# Patient Record
Sex: FEMALE | Race: BLACK OR AFRICAN AMERICAN | NOT HISPANIC OR LATINO | Employment: OTHER | URBAN - METROPOLITAN AREA
[De-identification: names, ages, dates, MRNs, and addresses within clinical notes are randomized per-mention and may not be internally consistent; named-entity substitution may affect disease eponyms.]

---

## 2023-11-21 ENCOUNTER — OFFICE VISIT (OUTPATIENT)
Dept: CARDIOLOGY | Facility: HOSPITAL | Age: 68
End: 2023-11-21
Payer: MEDICARE

## 2023-11-21 VITALS
DIASTOLIC BLOOD PRESSURE: 60 MMHG | SYSTOLIC BLOOD PRESSURE: 140 MMHG | BODY MASS INDEX: 24.24 KG/M2 | WEIGHT: 128.4 LBS | HEIGHT: 61 IN | HEART RATE: 63 BPM

## 2023-11-21 DIAGNOSIS — I25.10 CORONARY ARTERY DISEASE INVOLVING NATIVE CORONARY ARTERY OF NATIVE HEART WITHOUT ANGINA PECTORIS: Primary | ICD-10-CM

## 2023-11-21 DIAGNOSIS — E78.2 MIXED HYPERLIPIDEMIA: ICD-10-CM

## 2023-11-21 DIAGNOSIS — I73.9 PVD (PERIPHERAL VASCULAR DISEASE) (CMS-HCC): ICD-10-CM

## 2023-11-21 DIAGNOSIS — I10 ESSENTIAL HYPERTENSION: ICD-10-CM

## 2023-11-21 PROBLEM — E78.5 HYPERLIPIDEMIA: Status: ACTIVE | Noted: 2023-11-21

## 2023-11-21 PROBLEM — I63.9 CVA (CEREBRAL VASCULAR ACCIDENT) (MULTI): Status: ACTIVE | Noted: 2023-11-21

## 2023-11-21 PROCEDURE — 1159F MED LIST DOCD IN RCRD: CPT | Performed by: NURSE PRACTITIONER

## 2023-11-21 PROCEDURE — 99214 OFFICE O/P EST MOD 30 MIN: CPT | Mod: 25 | Performed by: NURSE PRACTITIONER

## 2023-11-21 PROCEDURE — 3077F SYST BP >= 140 MM HG: CPT | Performed by: NURSE PRACTITIONER

## 2023-11-21 PROCEDURE — 3078F DIAST BP <80 MM HG: CPT | Performed by: NURSE PRACTITIONER

## 2023-11-21 PROCEDURE — 1160F RVW MEDS BY RX/DR IN RCRD: CPT | Performed by: NURSE PRACTITIONER

## 2023-11-21 PROCEDURE — 93005 ELECTROCARDIOGRAM TRACING: CPT | Performed by: NURSE PRACTITIONER

## 2023-11-21 PROCEDURE — 99214 OFFICE O/P EST MOD 30 MIN: CPT | Performed by: NURSE PRACTITIONER

## 2023-11-21 PROCEDURE — 1126F AMNT PAIN NOTED NONE PRSNT: CPT | Performed by: NURSE PRACTITIONER

## 2023-11-21 RX ORDER — LISINOPRIL AND HYDROCHLOROTHIAZIDE 12.5; 2 MG/1; MG/1
TABLET ORAL
COMMUNITY

## 2023-11-21 RX ORDER — BRIMONIDINE TARTRATE AND TIMOLOL MALEATE 2; 5 MG/ML; MG/ML
SOLUTION OPHTHALMIC
COMMUNITY
Start: 2023-09-11 | End: 2024-06-06 | Stop reason: SDUPTHER

## 2023-11-21 RX ORDER — DORZOLAMIDE HYDROCHLORIDE AND TIMOLOL MALEATE 20; 5 MG/ML; MG/ML
1 SOLUTION/ DROPS OPHTHALMIC 2 TIMES DAILY
COMMUNITY
Start: 2023-06-06

## 2023-11-21 RX ORDER — ASPIRIN 81 MG/1
81 TABLET ORAL
COMMUNITY

## 2023-11-21 RX ORDER — ATORVASTATIN CALCIUM 80 MG/1
80 TABLET, FILM COATED ORAL DAILY
COMMUNITY
Start: 2022-02-02

## 2023-11-21 RX ORDER — LATANOPROST 50 UG/ML
SOLUTION/ DROPS OPHTHALMIC
COMMUNITY
Start: 2023-10-24 | End: 2024-06-06 | Stop reason: SDUPTHER

## 2023-11-21 RX ORDER — METOPROLOL TARTRATE 25 MG/1
25 TABLET, FILM COATED ORAL 2 TIMES DAILY
COMMUNITY

## 2023-11-21 RX ORDER — PANTOPRAZOLE SODIUM 20 MG/1
20 TABLET, DELAYED RELEASE ORAL DAILY
COMMUNITY

## 2023-11-21 RX ORDER — CLOPIDOGREL BISULFATE 75 MG/1
TABLET ORAL
COMMUNITY

## 2023-11-21 ASSESSMENT — ENCOUNTER SYMPTOMS
LOSS OF SENSATION IN FEET: 0
OCCASIONAL FEELINGS OF UNSTEADINESS: 0
DEPRESSION: 0

## 2023-11-21 NOTE — PROGRESS NOTES
Subjective   Rupali Bishop is a 68 y.o. female.    Chief Complaint:  Hypertension and Hyperlipidemia    Mrs. Bishop returns for a 6 month follow up. She has been feeling well from a cardiac standpoint. She has remained compliant with her medications, denying any intolerances. She seems to be getting around reasonably well, denying any exertional chest pain or shortness of breath. She denies any recent ER visits or hospitalizations. She offers no specific cardiovascular complaints or concerns today. She denies any complaints of chest pain, shortness of breath, lightheadedness, dizziness, palpitations, syncope, orthopnea, paroxysmal nocturnal dyspnea, lower extremity swelling or bleeding concerns.        Review of Systems   All other systems reviewed and are negative.      Objective   Physical Exam  Constitutional:       Appearance: Healthy appearance. In no distress  Pulmonary:      Effort: Pulmonary effort is normal.      Breath sounds: Normal breath sounds.   Cardiovascular:      Normal rate. Regular rhythm. Normal S1. Normal S2.       Murmurs: There is no murmur.   Edema:     Peripheral edema absent.   Abdominal:      Palpations: Abdomen is soft.   Musculoskeletal: Normal range of motion.      Cervical back: Normal range of motion. Skin:     General: Skin is warm and dry.   Neurological:      Mental Status: Alert and oriented to person, place and time.     EKG obtained and reviewed. Normal sinus rhythm. Septal infarct, age undetermined. HR 63    Lab Review:   Lab Results   Component Value Date     02/13/2023    K 4.1 02/13/2023     02/13/2023    CO2 28 02/13/2023    BUN 10 02/13/2023    CREATININE 0.73 02/13/2023    GLUCOSE 81 02/13/2023    CALCIUM 8.8 02/13/2023     Lab Results   Component Value Date    WBC 6.3 02/13/2023    HGB 11.4 (L) 02/13/2023    HCT 35.3 (L) 02/13/2023    MCV 88 02/13/2023     02/13/2023     Lab Results   Component Value Date    CHOL 133 02/13/2023    TRIG 76 02/13/2023     HDL 55.5 02/13/2023       Assessment/Plan   There were no encounter diagnoses.  Mrs. Bishop is a pleasant 68 year old  female with a past medical history significant for medically managed CAD by cardiac cath in 2015, PVD (iliac as well as a unilateral renal artery stenosis), CVA 2/23/2022 from L ICA occlusion, hypertension, hyperlipidemia and former smoker. Carotid ultrasound 5/2023 showed a string sign in her left internal carotid with no significant right carotid disease, stable and unchanged from prior studies. Echocardiogram 8/2022 showed an EF of 60-65% with no significant valvular disease. She presents today for routine follow up stable from a cardiac standpoint. Her VS and EKG remain stable. She remains on appropriate antiplatelet and lipid lowering therapy with her LDL at goal. She seems to be getting around reasonably well, denying any exertional intolerances. I will have her continue all medications unchanged. She will follow up with us in clinic in 6 months. She knows to call for any concerns.

## 2024-01-18 ENCOUNTER — OFFICE VISIT (OUTPATIENT)
Dept: OPHTHALMOLOGY | Facility: CLINIC | Age: 69
End: 2024-01-18
Payer: MEDICARE

## 2024-01-18 DIAGNOSIS — H40.1130 PRIMARY OPEN ANGLE GLAUCOMA OF BOTH EYES, UNSPECIFIED GLAUCOMA STAGE: Primary | ICD-10-CM

## 2024-01-18 DIAGNOSIS — H04.123 DRY EYES, BILATERAL: ICD-10-CM

## 2024-01-18 DIAGNOSIS — H25.13 AGE-RELATED NUCLEAR CATARACT OF BOTH EYES: ICD-10-CM

## 2024-01-18 DIAGNOSIS — H40.1133 PRIMARY OPEN ANGLE GLAUCOMA (POAG) OF BOTH EYES, SEVERE STAGE: ICD-10-CM

## 2024-01-18 PROCEDURE — 92083 EXTENDED VISUAL FIELD XM: CPT | Performed by: OPHTHALMOLOGY

## 2024-01-18 PROCEDURE — 99212 OFFICE O/P EST SF 10 MIN: CPT | Performed by: OPHTHALMOLOGY

## 2024-01-18 RX ORDER — PREDNISOLONE ACETATE 10 MG/ML
SUSPENSION/ DROPS OPHTHALMIC
COMMUNITY
Start: 2023-11-08 | End: 2024-03-07

## 2024-01-18 RX ORDER — ALBUTEROL SULFATE 90 UG/1
AEROSOL, METERED RESPIRATORY (INHALATION)
COMMUNITY
Start: 2023-12-05

## 2024-01-18 ASSESSMENT — TONOMETRY
OD_IOP_MMHG: 13
IOP_METHOD: GOLDMANN APPLANATION
OS_IOP_MMHG: 26

## 2024-01-18 ASSESSMENT — ENCOUNTER SYMPTOMS
CARDIOVASCULAR NEGATIVE: 0
PSYCHIATRIC NEGATIVE: 0
MUSCULOSKELETAL NEGATIVE: 0
NEUROLOGICAL NEGATIVE: 0
ENDOCRINE NEGATIVE: 0
EYES NEGATIVE: 1
CONSTITUTIONAL NEGATIVE: 0
GASTROINTESTINAL NEGATIVE: 0
HEMATOLOGIC/LYMPHATIC NEGATIVE: 0
RESPIRATORY NEGATIVE: 0
ALLERGIC/IMMUNOLOGIC NEGATIVE: 0

## 2024-01-18 ASSESSMENT — CONF VISUAL FIELD
OS_INFERIOR_TEMPORAL_RESTRICTION: 1
OS_INFERIOR_NASAL_RESTRICTION: 1
OS_SUPERIOR_TEMPORAL_RESTRICTION: 1
OS_SUPERIOR_NASAL_RESTRICTION: 1
COMMENTS: SEE HVF

## 2024-01-18 ASSESSMENT — VISUAL ACUITY
METHOD: SNELLEN - LINEAR
OS_SC: NLP
OD_SC+: +1
OD_BAT_HIGH: 20/40-1
OD_SC: 20/25

## 2024-01-18 ASSESSMENT — SLIT LAMP EXAM - LIDS
COMMENTS: GOOD POSITION
COMMENTS: GOOD POSITION

## 2024-01-18 ASSESSMENT — EXTERNAL EXAM - RIGHT EYE: OD_EXAM: NORMAL

## 2024-01-18 ASSESSMENT — PACHYMETRY
OD_CT(UM): 570
OS_CT(UM): 597

## 2024-01-18 ASSESSMENT — CUP TO DISC RATIO: OD_RATIO: .3

## 2024-01-18 ASSESSMENT — EXTERNAL EXAM - LEFT EYE: OS_EXAM: NORMAL

## 2024-02-08 ENCOUNTER — LAB (OUTPATIENT)
Dept: LAB | Facility: LAB | Age: 69
End: 2024-02-08
Payer: MEDICARE

## 2024-02-08 ENCOUNTER — HOSPITAL ENCOUNTER (OUTPATIENT)
Dept: RADIOLOGY | Facility: CLINIC | Age: 69
Discharge: HOME | End: 2024-02-08
Payer: MEDICARE

## 2024-02-08 DIAGNOSIS — Z12.31 ENCOUNTER FOR SCREENING MAMMOGRAM FOR MALIGNANT NEOPLASM OF BREAST: ICD-10-CM

## 2024-02-08 DIAGNOSIS — I10 ESSENTIAL (PRIMARY) HYPERTENSION: Primary | ICD-10-CM

## 2024-02-08 DIAGNOSIS — E78.00 PURE HYPERCHOLESTEROLEMIA, UNSPECIFIED: ICD-10-CM

## 2024-02-08 LAB
ALBUMIN SERPL BCP-MCNC: 4.2 G/DL (ref 3.4–5)
ALP SERPL-CCNC: 56 U/L (ref 33–136)
ALT SERPL W P-5'-P-CCNC: 11 U/L (ref 7–45)
ANION GAP SERPL CALC-SCNC: 11 MMOL/L (ref 10–20)
AST SERPL W P-5'-P-CCNC: 36 U/L (ref 9–39)
BASOPHILS # BLD AUTO: 0.01 X10*3/UL (ref 0–0.1)
BASOPHILS NFR BLD AUTO: 0.2 %
BILIRUB SERPL-MCNC: 0.5 MG/DL (ref 0–1.2)
BUN SERPL-MCNC: 13 MG/DL (ref 6–23)
CALCIUM SERPL-MCNC: 9.5 MG/DL (ref 8.6–10.3)
CHLORIDE SERPL-SCNC: 102 MMOL/L (ref 98–107)
CHOLEST SERPL-MCNC: 141 MG/DL (ref 0–199)
CHOLESTEROL/HDL RATIO: 2.6
CO2 SERPL-SCNC: 30 MMOL/L (ref 21–32)
CREAT SERPL-MCNC: 0.81 MG/DL (ref 0.5–1.05)
CREAT UR-MCNC: 162.8 MG/DL (ref 20–320)
EGFRCR SERPLBLD CKD-EPI 2021: 79 ML/MIN/1.73M*2
EOSINOPHIL # BLD AUTO: 0.07 X10*3/UL (ref 0–0.7)
EOSINOPHIL NFR BLD AUTO: 1.1 %
ERYTHROCYTE [DISTWIDTH] IN BLOOD BY AUTOMATED COUNT: 13.4 % (ref 11.5–14.5)
GLUCOSE SERPL-MCNC: 98 MG/DL (ref 74–99)
HCT VFR BLD AUTO: 37.3 % (ref 36–46)
HDLC SERPL-MCNC: 54.3 MG/DL
HGB BLD-MCNC: 12 G/DL (ref 12–16)
IMM GRANULOCYTES # BLD AUTO: 0.02 X10*3/UL (ref 0–0.7)
IMM GRANULOCYTES NFR BLD AUTO: 0.3 % (ref 0–0.9)
LDLC SERPL CALC-MCNC: 72 MG/DL
LYMPHOCYTES # BLD AUTO: 2.09 X10*3/UL (ref 1.2–4.8)
LYMPHOCYTES NFR BLD AUTO: 34.3 %
MCH RBC QN AUTO: 28 PG (ref 26–34)
MCHC RBC AUTO-ENTMCNC: 32.2 G/DL (ref 32–36)
MCV RBC AUTO: 87 FL (ref 80–100)
MICROALBUMIN UR-MCNC: 7.4 MG/L
MICROALBUMIN/CREAT UR: 4.5 UG/MG CREAT
MONOCYTES # BLD AUTO: 0.45 X10*3/UL (ref 0.1–1)
MONOCYTES NFR BLD AUTO: 7.4 %
NEUTROPHILS # BLD AUTO: 3.46 X10*3/UL (ref 1.2–7.7)
NEUTROPHILS NFR BLD AUTO: 56.7 %
NON HDL CHOLESTEROL: 87 MG/DL (ref 0–149)
NRBC BLD-RTO: 0 /100 WBCS (ref 0–0)
PLATELET # BLD AUTO: 239 X10*3/UL (ref 150–450)
POTASSIUM SERPL-SCNC: 4.2 MMOL/L (ref 3.5–5.3)
PROT SERPL-MCNC: 6.5 G/DL (ref 6.4–8.2)
RBC # BLD AUTO: 4.29 X10*6/UL (ref 4–5.2)
SODIUM SERPL-SCNC: 139 MMOL/L (ref 136–145)
TRIGL SERPL-MCNC: 73 MG/DL (ref 0–149)
VLDL: 15 MG/DL (ref 0–40)
WBC # BLD AUTO: 6.1 X10*3/UL (ref 4.4–11.3)

## 2024-02-08 PROCEDURE — 85025 COMPLETE CBC W/AUTO DIFF WBC: CPT

## 2024-02-08 PROCEDURE — 80053 COMPREHEN METABOLIC PANEL: CPT

## 2024-02-08 PROCEDURE — 82043 UR ALBUMIN QUANTITATIVE: CPT

## 2024-02-08 PROCEDURE — 36415 COLL VENOUS BLD VENIPUNCTURE: CPT

## 2024-02-08 PROCEDURE — 77067 SCR MAMMO BI INCL CAD: CPT

## 2024-02-08 PROCEDURE — 82570 ASSAY OF URINE CREATININE: CPT

## 2024-02-08 PROCEDURE — 80061 LIPID PANEL: CPT

## 2024-02-08 PROCEDURE — 77067 SCR MAMMO BI INCL CAD: CPT | Performed by: RADIOLOGY

## 2024-02-08 PROCEDURE — 77063 BREAST TOMOSYNTHESIS BI: CPT | Performed by: RADIOLOGY

## 2024-02-19 ENCOUNTER — APPOINTMENT (OUTPATIENT)
Dept: OBSTETRICS AND GYNECOLOGY | Facility: CLINIC | Age: 69
End: 2024-02-19
Payer: MEDICARE

## 2024-03-06 DIAGNOSIS — H04.129 DRY EYE: Primary | ICD-10-CM

## 2024-03-07 RX ORDER — PREDNISOLONE ACETATE 10 MG/ML
SUSPENSION/ DROPS OPHTHALMIC
Qty: 5 ML | Refills: 1 | Status: SHIPPED | OUTPATIENT
Start: 2024-03-07 | End: 2024-06-06 | Stop reason: SDUPTHER

## 2024-03-11 ENCOUNTER — APPOINTMENT (OUTPATIENT)
Dept: OBSTETRICS AND GYNECOLOGY | Facility: CLINIC | Age: 69
End: 2024-03-11
Payer: MEDICARE

## 2024-03-18 ENCOUNTER — OFFICE VISIT (OUTPATIENT)
Dept: OBSTETRICS AND GYNECOLOGY | Facility: CLINIC | Age: 69
End: 2024-03-18
Payer: MEDICARE

## 2024-03-18 VITALS
BODY MASS INDEX: 24.96 KG/M2 | WEIGHT: 132.2 LBS | SYSTOLIC BLOOD PRESSURE: 146 MMHG | DIASTOLIC BLOOD PRESSURE: 84 MMHG | HEIGHT: 61 IN

## 2024-03-18 DIAGNOSIS — N95.0 POSTMENOPAUSAL BLEEDING: Primary | ICD-10-CM

## 2024-03-18 DIAGNOSIS — Z01.818 PREOPERATIVE EXAM FOR GYNECOLOGIC SURGERY: ICD-10-CM

## 2024-03-18 PROCEDURE — 1036F TOBACCO NON-USER: CPT | Performed by: OBSTETRICS & GYNECOLOGY

## 2024-03-18 PROCEDURE — 3079F DIAST BP 80-89 MM HG: CPT | Performed by: OBSTETRICS & GYNECOLOGY

## 2024-03-18 PROCEDURE — 3077F SYST BP >= 140 MM HG: CPT | Performed by: OBSTETRICS & GYNECOLOGY

## 2024-03-18 PROCEDURE — 1159F MED LIST DOCD IN RCRD: CPT | Performed by: OBSTETRICS & GYNECOLOGY

## 2024-03-18 PROCEDURE — 1126F AMNT PAIN NOTED NONE PRSNT: CPT | Performed by: OBSTETRICS & GYNECOLOGY

## 2024-03-18 PROCEDURE — 99214 OFFICE O/P EST MOD 30 MIN: CPT | Performed by: OBSTETRICS & GYNECOLOGY

## 2024-03-18 ASSESSMENT — ENCOUNTER SYMPTOMS
ALLERGIC/IMMUNOLOGIC NEGATIVE: 0
CARDIOVASCULAR NEGATIVE: 0
NEUROLOGICAL NEGATIVE: 0
MUSCULOSKELETAL NEGATIVE: 0
RESPIRATORY NEGATIVE: 0
GASTROINTESTINAL NEGATIVE: 0
HEMATOLOGIC/LYMPHATIC NEGATIVE: 0
ENDOCRINE NEGATIVE: 0
PSYCHIATRIC NEGATIVE: 0
CONSTITUTIONAL NEGATIVE: 0
EYES NEGATIVE: 0

## 2024-03-18 ASSESSMENT — PATIENT HEALTH QUESTIONNAIRE - PHQ9
2. FEELING DOWN, DEPRESSED OR HOPELESS: NOT AT ALL
SUM OF ALL RESPONSES TO PHQ9 QUESTIONS 1 AND 2: 0
1. LITTLE INTEREST OR PLEASURE IN DOING THINGS: NOT AT ALL

## 2024-03-18 ASSESSMENT — PAIN SCALES - GENERAL: PAINLEVEL: 0-NO PAIN

## 2024-03-18 NOTE — PROGRESS NOTES
Subjective   Patient ID: Rupali Bishop is a 68 y.o. female who presents for Gynecologic Exam (Pt here for Vaginal Bleeding x 1month. Last pap none on file. Mammogram 2/8/24 -.).  Patient here complaining of an episode of genital bleeding.  Patient notes that she was on a cruise and noticed some blood on her sheets.  Uncertain as to actual origin.  Believes it was probably the vagina but it could have been external.  Denies any nausea vomiting fever chills no GI or  complaints.  No prior history.  Patient is not sexually active.  No vaginal manipulation.    Gynecologic Exam    Review of Systems   Genitourinary:  Positive for vaginal bleeding.   All other systems reviewed and are negative.    Objective   Physical Exam  Vitals reviewed.   Constitutional:       Appearance: She is normal weight.   Cardiovascular:      Rate and Rhythm: Normal rate and regular rhythm.   Pulmonary:      Effort: Pulmonary effort is normal.      Breath sounds: Normal breath sounds.   Abdominal:      General: Abdomen is flat. Bowel sounds are normal. There is no distension.      Palpations: Abdomen is soft. There is no mass.      Tenderness: There is no abdominal tenderness.   Genitourinary:     Comments: External genitalia unremarkable  Vagina clear positive atrophy  Cervix very atrophic posterior, uterus small difficult to palpate  Adnexa unremarkable  Perineum without lesions or swelling  Neurological:      General: No focal deficit present.      Mental Status: She is alert.   Psychiatric:         Mood and Affect: Mood normal.         Behavior: Behavior normal.         Thought Content: Thought content normal.         Judgment: Judgment normal.         Assessment/Plan   Diagnoses and all orders for this visit:  Postmenopausal bleeding  -     US PELVIS TRANSABDOMINAL WITH TRANSVAGINAL; Future  Reviewed findings with patient.  Patient currently is here with a history of postmenopausal bleeding.  Patient is a little uncertain as to where the  bleeding came from but upon discussion since surgery was probably from the vagina.  No external lesions were noted.  Just a one-time on the sheets.  Small amount.  Will order pelvic ultrasound.  Cervix very atrophic and posterior unable to do office endometrial sampling.  Discussed doing diagnostic hysteroscopy D&C with patient in hospital under anesthesia.  Risks and benefits of procedure reviewed with patient.  Wants to wait till results of ultrasound are back.  Will call and reevaluate       Ernesto Trivedi MD 03/18/24 12:22 PM

## 2024-04-15 ENCOUNTER — APPOINTMENT (OUTPATIENT)
Dept: RADIOLOGY | Facility: HOSPITAL | Age: 69
End: 2024-04-15
Payer: COMMERCIAL

## 2024-05-06 ENCOUNTER — HOSPITAL ENCOUNTER (OUTPATIENT)
Dept: RADIOLOGY | Facility: HOSPITAL | Age: 69
Discharge: HOME | End: 2024-05-06
Payer: COMMERCIAL

## 2024-05-06 DIAGNOSIS — N95.0 POSTMENOPAUSAL BLEEDING: ICD-10-CM

## 2024-05-06 PROCEDURE — 76830 TRANSVAGINAL US NON-OB: CPT | Performed by: RADIOLOGY

## 2024-05-06 PROCEDURE — 76856 US EXAM PELVIC COMPLETE: CPT

## 2024-05-06 PROCEDURE — 76857 US EXAM PELVIC LIMITED: CPT | Performed by: RADIOLOGY

## 2024-05-08 ENCOUNTER — TELEPHONE (OUTPATIENT)
Dept: OBSTETRICS AND GYNECOLOGY | Facility: CLINIC | Age: 69
End: 2024-05-08
Payer: COMMERCIAL

## 2024-05-08 NOTE — RESULT ENCOUNTER NOTE
Ultrasound is unremarkable with normal endometrial thickness.  Patient should come into office for reevaluation and discussion of options

## 2024-05-10 NOTE — TELEPHONE ENCOUNTER
Patient called and verified  RN educated Ultrasound is unremarkable with normal endometrial thickness.    Patient should come into office for reevaluation and discussion of options   RN scheduled appointment for 5/24/24  Heidi Harris RN

## 2024-05-24 ENCOUNTER — OFFICE VISIT (OUTPATIENT)
Dept: OBSTETRICS AND GYNECOLOGY | Facility: CLINIC | Age: 69
End: 2024-05-24
Payer: COMMERCIAL

## 2024-05-24 VITALS
SYSTOLIC BLOOD PRESSURE: 165 MMHG | WEIGHT: 132.8 LBS | BODY MASS INDEX: 25.07 KG/M2 | HEIGHT: 61 IN | DIASTOLIC BLOOD PRESSURE: 74 MMHG

## 2024-05-24 DIAGNOSIS — Z71.2 ENCOUNTER TO DISCUSS TEST RESULTS: ICD-10-CM

## 2024-05-24 DIAGNOSIS — N95.0 POST-MENOPAUSAL BLEEDING: Primary | ICD-10-CM

## 2024-05-24 PROCEDURE — 3078F DIAST BP <80 MM HG: CPT | Performed by: OBSTETRICS & GYNECOLOGY

## 2024-05-24 PROCEDURE — 99213 OFFICE O/P EST LOW 20 MIN: CPT | Performed by: OBSTETRICS & GYNECOLOGY

## 2024-05-24 PROCEDURE — 3077F SYST BP >= 140 MM HG: CPT | Performed by: OBSTETRICS & GYNECOLOGY

## 2024-05-24 PROCEDURE — 1036F TOBACCO NON-USER: CPT | Performed by: OBSTETRICS & GYNECOLOGY

## 2024-05-24 PROCEDURE — 1159F MED LIST DOCD IN RCRD: CPT | Performed by: OBSTETRICS & GYNECOLOGY

## 2024-05-24 PROCEDURE — 1126F AMNT PAIN NOTED NONE PRSNT: CPT | Performed by: OBSTETRICS & GYNECOLOGY

## 2024-05-24 PROCEDURE — 1160F RVW MEDS BY RX/DR IN RCRD: CPT | Performed by: OBSTETRICS & GYNECOLOGY

## 2024-05-24 ASSESSMENT — ENCOUNTER SYMPTOMS
HEMATOLOGIC/LYMPHATIC NEGATIVE: 0
CARDIOVASCULAR NEGATIVE: 0
CONSTITUTIONAL NEGATIVE: 0
PSYCHIATRIC NEGATIVE: 0
NEUROLOGICAL NEGATIVE: 0
MUSCULOSKELETAL NEGATIVE: 0
ALLERGIC/IMMUNOLOGIC NEGATIVE: 0
EYES NEGATIVE: 0
GASTROINTESTINAL NEGATIVE: 0
ENDOCRINE NEGATIVE: 0
RESPIRATORY NEGATIVE: 0

## 2024-05-24 ASSESSMENT — PATIENT HEALTH QUESTIONNAIRE - PHQ9
SUM OF ALL RESPONSES TO PHQ9 QUESTIONS 1 & 2: 1
1. LITTLE INTEREST OR PLEASURE IN DOING THINGS: SEVERAL DAYS
10. IF YOU CHECKED OFF ANY PROBLEMS, HOW DIFFICULT HAVE THESE PROBLEMS MADE IT FOR YOU TO DO YOUR WORK, TAKE CARE OF THINGS AT HOME, OR GET ALONG WITH OTHER PEOPLE: NOT DIFFICULT AT ALL
2. FEELING DOWN, DEPRESSED OR HOPELESS: NOT AT ALL

## 2024-05-24 ASSESSMENT — PAIN SCALES - GENERAL: PAINLEVEL: 0-NO PAIN

## 2024-05-24 NOTE — PROGRESS NOTES
Subjective   Patient ID: Rupali Bishop is a 69 y.o. female who presents for Results (Last pap None on File. Mammogram 2/8/24 -.).  Patient here for consultation with daughter.  Patient notes that she was on a cruise and noticed a spot of blood on the sheets.  Uncertain as to where it came from.  This was possibly the vagina but it could have been rectum.  Patient notes that her close were rubbing and she was irritated down there in general.  Ultrasound reviewed with patient.  Currently is unremarkable.  Normal thickness.  Patient has had no recurrent bleeding.    Review of Systems  Negative outside of that episode of bleeding    Objective   Physical Exam  Constitutional:       Appearance: Normal appearance. She is normal weight.   Neurological:      Mental Status: She is alert.   Psychiatric:         Mood and Affect: Mood normal.         Behavior: Behavior normal.         Thought Content: Thought content normal.         Judgment: Judgment normal.     Assessment/Plan   Diagnoses and all orders for this visit:  Post-menopausal bleeding  Encounter to discuss test results    Discussion with patient and daughter.  Chart reviewed.  Patient had an episode of bleeding while on a cruise 1 spot of blood.  Uncertain as to exactly where it came from.  Please may have been vaginal could also been rectal.  Patient notes she was having a lot of irritation in that whole area.  Has had no issues since.  Ultrasound shows normal thickness.  Unable to do endometrial biopsy in office.  Reviewed options with patient.  Scheduled for diagnostic hysteroscopy D&C versus observation.  In view of negative ultrasound and questionable source of bleeding with no further episodes, believe it is reasonable to observe this patient.  She does realize there is a small chance of abnormal findings.  Patient will likely observe understand risks and benefits       Ernesto Trivedi MD 05/24/24 12:21 PM

## 2024-06-06 ENCOUNTER — OFFICE VISIT (OUTPATIENT)
Dept: OPHTHALMOLOGY | Facility: CLINIC | Age: 69
End: 2024-06-06
Payer: COMMERCIAL

## 2024-06-06 DIAGNOSIS — H04.129 DRY EYE: ICD-10-CM

## 2024-06-06 DIAGNOSIS — H40.1130 PRIMARY OPEN ANGLE GLAUCOMA OF BOTH EYES, UNSPECIFIED GLAUCOMA STAGE: Primary | ICD-10-CM

## 2024-06-06 PROCEDURE — 99214 OFFICE O/P EST MOD 30 MIN: CPT | Performed by: OPHTHALMOLOGY

## 2024-06-06 PROCEDURE — 92133 CPTRZD OPH DX IMG PST SGM ON: CPT | Performed by: OPHTHALMOLOGY

## 2024-06-06 RX ORDER — PREDNISOLONE ACETATE 10 MG/ML
SUSPENSION/ DROPS OPHTHALMIC
Qty: 5 ML | Refills: 11 | Status: SHIPPED | OUTPATIENT
Start: 2024-06-06

## 2024-06-06 RX ORDER — BRIMONIDINE TARTRATE AND TIMOLOL MALEATE 2; 5 MG/ML; MG/ML
SOLUTION OPHTHALMIC
Qty: 10 ML | Refills: 11 | Status: SHIPPED | OUTPATIENT
Start: 2024-06-06

## 2024-06-06 RX ORDER — LATANOPROST 50 UG/ML
SOLUTION/ DROPS OPHTHALMIC
Qty: 2.5 ML | Refills: 11 | Status: SHIPPED | OUTPATIENT
Start: 2024-06-06

## 2024-06-06 ASSESSMENT — VISUAL ACUITY
OD_SC: 20/25
OD_SC+: -3
OS_SC: NLP
METHOD: SNELLEN - LINEAR

## 2024-06-06 ASSESSMENT — ENCOUNTER SYMPTOMS
PSYCHIATRIC NEGATIVE: 0
RESPIRATORY NEGATIVE: 0
ALLERGIC/IMMUNOLOGIC NEGATIVE: 0
ENDOCRINE NEGATIVE: 0
CARDIOVASCULAR NEGATIVE: 0
CONSTITUTIONAL NEGATIVE: 0
NEUROLOGICAL NEGATIVE: 0
GASTROINTESTINAL NEGATIVE: 0
HEMATOLOGIC/LYMPHATIC NEGATIVE: 0
MUSCULOSKELETAL NEGATIVE: 0
EYES NEGATIVE: 1

## 2024-06-06 ASSESSMENT — GONIOSCOPY
OS_SUPERIOR: 3/360
OD_SUPERIOR: 3/360

## 2024-06-06 ASSESSMENT — CUP TO DISC RATIO
OS_RATIO: 1.0
OD_RATIO: .3

## 2024-06-06 ASSESSMENT — TONOMETRY
OD_IOP_MMHG: 13
OS_IOP_MMHG: 18
IOP_METHOD: GOLDMANN APPLANATION

## 2024-06-06 ASSESSMENT — CONF VISUAL FIELD
OS_SUPERIOR_NASAL_RESTRICTION: 1
OS_INFERIOR_TEMPORAL_RESTRICTION: 1
OS_INFERIOR_NASAL_RESTRICTION: 1
OS_SUPERIOR_TEMPORAL_RESTRICTION: 1

## 2024-06-06 ASSESSMENT — EXTERNAL EXAM - RIGHT EYE: OD_EXAM: NORMAL

## 2024-06-06 ASSESSMENT — EXTERNAL EXAM - LEFT EYE: OS_EXAM: NORMAL

## 2024-06-06 ASSESSMENT — PACHYMETRY
OD_CT(UM): 570
OS_CT(UM): 597

## 2024-06-06 ASSESSMENT — SLIT LAMP EXAM - LIDS
COMMENTS: GOOD POSITION
COMMENTS: GOOD POSITION

## 2024-06-06 NOTE — PROGRESS NOTES
1-advanced oag os; mild od stable on meds  2-ns ou  refr optom glasses od  3-sanchez start tears ou  4-anterior ischemic optic neuropathy (AION) os  
ambulatory

## 2024-08-01 ENCOUNTER — APPOINTMENT (OUTPATIENT)
Dept: OPHTHALMOLOGY | Facility: CLINIC | Age: 69
End: 2024-08-01
Payer: COMMERCIAL

## 2024-08-19 ENCOUNTER — APPOINTMENT (OUTPATIENT)
Dept: OBSTETRICS AND GYNECOLOGY | Facility: CLINIC | Age: 69
End: 2024-08-19
Payer: COMMERCIAL

## 2024-10-02 ENCOUNTER — LAB (OUTPATIENT)
Dept: LAB | Facility: LAB | Age: 69
End: 2024-10-02
Payer: COMMERCIAL

## 2024-10-02 DIAGNOSIS — I10 ESSENTIAL (PRIMARY) HYPERTENSION: Primary | ICD-10-CM

## 2024-10-02 DIAGNOSIS — E78.01 FAMILIAL HYPERCHOLESTEROLEMIA: ICD-10-CM

## 2024-10-02 LAB
ALBUMIN SERPL BCP-MCNC: 4.2 G/DL (ref 3.4–5)
ALP SERPL-CCNC: 57 U/L (ref 33–136)
ALT SERPL W P-5'-P-CCNC: 15 U/L (ref 7–45)
ANION GAP SERPL CALC-SCNC: 12 MMOL/L (ref 10–20)
AST SERPL W P-5'-P-CCNC: 35 U/L (ref 9–39)
BASOPHILS # BLD AUTO: 0.02 X10*3/UL (ref 0–0.1)
BASOPHILS NFR BLD AUTO: 0.3 %
BILIRUB SERPL-MCNC: 0.3 MG/DL (ref 0–1.2)
BUN SERPL-MCNC: 11 MG/DL (ref 6–23)
CALCIUM SERPL-MCNC: 9.7 MG/DL (ref 8.6–10.6)
CHLORIDE SERPL-SCNC: 104 MMOL/L (ref 98–107)
CO2 SERPL-SCNC: 30 MMOL/L (ref 21–32)
CREAT SERPL-MCNC: 0.8 MG/DL (ref 0.5–1.05)
EGFRCR SERPLBLD CKD-EPI 2021: 79 ML/MIN/1.73M*2
EOSINOPHIL # BLD AUTO: 0.09 X10*3/UL (ref 0–0.7)
EOSINOPHIL NFR BLD AUTO: 1.3 %
ERYTHROCYTE [DISTWIDTH] IN BLOOD BY AUTOMATED COUNT: 15.6 % (ref 11.5–14.5)
GLUCOSE SERPL-MCNC: 102 MG/DL (ref 74–99)
HCT VFR BLD AUTO: 29.1 % (ref 36–46)
HGB BLD-MCNC: 9.3 G/DL (ref 12–16)
IMM GRANULOCYTES # BLD AUTO: 0.01 X10*3/UL (ref 0–0.7)
IMM GRANULOCYTES NFR BLD AUTO: 0.1 % (ref 0–0.9)
LYMPHOCYTES # BLD AUTO: 2.78 X10*3/UL (ref 1.2–4.8)
LYMPHOCYTES NFR BLD AUTO: 38.7 %
MCH RBC QN AUTO: 29.7 PG (ref 26–34)
MCHC RBC AUTO-ENTMCNC: 32 G/DL (ref 32–36)
MCV RBC AUTO: 93 FL (ref 80–100)
MONOCYTES # BLD AUTO: 0.52 X10*3/UL (ref 0.1–1)
MONOCYTES NFR BLD AUTO: 7.2 %
NEUTROPHILS # BLD AUTO: 3.77 X10*3/UL (ref 1.2–7.7)
NEUTROPHILS NFR BLD AUTO: 52.4 %
NRBC BLD-RTO: 0 /100 WBCS (ref 0–0)
PLATELET # BLD AUTO: 291 X10*3/UL (ref 150–450)
POTASSIUM SERPL-SCNC: 3.9 MMOL/L (ref 3.5–5.3)
PROT SERPL-MCNC: 6.7 G/DL (ref 6.4–8.2)
RBC # BLD AUTO: 3.13 X10*6/UL (ref 4–5.2)
SODIUM SERPL-SCNC: 142 MMOL/L (ref 136–145)
WBC # BLD AUTO: 7.2 X10*3/UL (ref 4.4–11.3)

## 2024-10-02 PROCEDURE — 80053 COMPREHEN METABOLIC PANEL: CPT

## 2024-10-02 PROCEDURE — 85025 COMPLETE CBC W/AUTO DIFF WBC: CPT

## 2024-10-03 ENCOUNTER — APPOINTMENT (OUTPATIENT)
Dept: OPHTHALMOLOGY | Facility: CLINIC | Age: 69
End: 2024-10-03
Payer: COMMERCIAL

## 2024-10-23 ENCOUNTER — HOSPITAL ENCOUNTER (OUTPATIENT)
Dept: VASCULAR MEDICINE | Facility: HOSPITAL | Age: 69
Discharge: HOME | End: 2024-10-23
Payer: COMMERCIAL

## 2024-10-23 DIAGNOSIS — I73.9 PVD (PERIPHERAL VASCULAR DISEASE) (CMS-HCC): ICD-10-CM

## 2024-10-23 PROCEDURE — 93922 UPR/L XTREMITY ART 2 LEVELS: CPT

## 2024-10-23 PROCEDURE — 93922 UPR/L XTREMITY ART 2 LEVELS: CPT | Performed by: INTERNAL MEDICINE

## 2024-10-31 ENCOUNTER — APPOINTMENT (OUTPATIENT)
Dept: OPHTHALMOLOGY | Facility: CLINIC | Age: 69
End: 2024-10-31
Payer: COMMERCIAL

## 2024-10-31 DIAGNOSIS — H52.4 REGULAR ASTIGMATISM OF BOTH EYES WITH PRESBYOPIA: ICD-10-CM

## 2024-10-31 DIAGNOSIS — H25.11 AGE-RELATED NUCLEAR CATARACT OF RIGHT EYE: Primary | ICD-10-CM

## 2024-10-31 DIAGNOSIS — H40.1130 PRIMARY OPEN ANGLE GLAUCOMA OF BOTH EYES, UNSPECIFIED GLAUCOMA STAGE: ICD-10-CM

## 2024-10-31 DIAGNOSIS — H04.123 DRY EYE SYNDROME OF BOTH EYES: ICD-10-CM

## 2024-10-31 DIAGNOSIS — H25.22 MORGAGNIAN AGE-RELATED CATARACT OF LEFT EYE: ICD-10-CM

## 2024-10-31 DIAGNOSIS — H52.223 REGULAR ASTIGMATISM OF BOTH EYES WITH PRESBYOPIA: ICD-10-CM

## 2024-10-31 PROCEDURE — 92015 DETERMINE REFRACTIVE STATE: CPT | Performed by: STUDENT IN AN ORGANIZED HEALTH CARE EDUCATION/TRAINING PROGRAM

## 2024-10-31 PROCEDURE — 92012 INTRM OPH EXAM EST PATIENT: CPT | Performed by: STUDENT IN AN ORGANIZED HEALTH CARE EDUCATION/TRAINING PROGRAM

## 2024-10-31 ASSESSMENT — ENCOUNTER SYMPTOMS
PSYCHIATRIC NEGATIVE: 0
NEUROLOGICAL NEGATIVE: 0
GASTROINTESTINAL NEGATIVE: 0
MUSCULOSKELETAL NEGATIVE: 0
ALLERGIC/IMMUNOLOGIC NEGATIVE: 0
EYES NEGATIVE: 0
RESPIRATORY NEGATIVE: 0
HEMATOLOGIC/LYMPHATIC NEGATIVE: 0
CONSTITUTIONAL NEGATIVE: 0
CARDIOVASCULAR NEGATIVE: 0
ENDOCRINE NEGATIVE: 0

## 2024-10-31 ASSESSMENT — CONF VISUAL FIELD
OD_SUPERIOR_NASAL_RESTRICTION: 0
OD_NORMAL: 1
OS_SUPERIOR_NASAL_RESTRICTION: 1
OD_INFERIOR_TEMPORAL_RESTRICTION: 0
OD_INFERIOR_NASAL_RESTRICTION: 0
OD_SUPERIOR_TEMPORAL_RESTRICTION: 0
OS_SUPERIOR_TEMPORAL_RESTRICTION: 1
OS_INFERIOR_NASAL_RESTRICTION: 1
OS_INFERIOR_TEMPORAL_RESTRICTION: 1

## 2024-10-31 ASSESSMENT — VISUAL ACUITY
OD_PH_SC: 20/20
OD_SC: 20/40
OS_SC: NLP
OD_PH_SC+: -2
METHOD: SNELLEN - LINEAR

## 2024-10-31 ASSESSMENT — REFRACTION_WEARINGRX
OD_AXIS: 145
OS_SPHERE: BALANCE
OD_CYLINDER: -0.50
OS_CYLINDER: SPHERE
SPECS_TYPE: READING
OD_SPHERE: +1.75

## 2024-10-31 ASSESSMENT — EXTERNAL EXAM - LEFT EYE: OS_EXAM: NORMAL

## 2024-10-31 ASSESSMENT — EXTERNAL EXAM - RIGHT EYE: OD_EXAM: NORMAL

## 2024-10-31 ASSESSMENT — PACHYMETRY
OD_CT(UM): 570
OS_CT(UM): 597

## 2024-10-31 ASSESSMENT — REFRACTION_MANIFEST
OD_SPHERE: -0.25
OS_CYLINDER: SPHERE
OD_CYLINDER: -0.50
OD_ADD: +2.50
OD_AXIS: 140

## 2024-11-18 ENCOUNTER — OFFICE VISIT (OUTPATIENT)
Dept: URGENT CARE | Age: 69
End: 2024-11-18
Payer: COMMERCIAL

## 2024-11-18 VITALS
SYSTOLIC BLOOD PRESSURE: 165 MMHG | HEART RATE: 75 BPM | TEMPERATURE: 97.7 F | WEIGHT: 136 LBS | OXYGEN SATURATION: 96 % | BODY MASS INDEX: 25.7 KG/M2 | DIASTOLIC BLOOD PRESSURE: 69 MMHG

## 2024-11-18 DIAGNOSIS — H61.21 HEARING LOSS DUE TO CERUMEN IMPACTION, RIGHT: Primary | ICD-10-CM

## 2024-11-18 DIAGNOSIS — I10 HYPERTENSION, UNSPECIFIED TYPE: ICD-10-CM

## 2024-11-18 PROCEDURE — 99203 OFFICE O/P NEW LOW 30 MIN: CPT | Performed by: PHYSICIAN ASSISTANT

## 2024-11-18 PROCEDURE — 1160F RVW MEDS BY RX/DR IN RCRD: CPT | Performed by: PHYSICIAN ASSISTANT

## 2024-11-18 PROCEDURE — 3077F SYST BP >= 140 MM HG: CPT | Performed by: PHYSICIAN ASSISTANT

## 2024-11-18 PROCEDURE — 69209 REMOVE IMPACTED EAR WAX UNI: CPT | Performed by: PHYSICIAN ASSISTANT

## 2024-11-18 PROCEDURE — 1036F TOBACCO NON-USER: CPT | Performed by: PHYSICIAN ASSISTANT

## 2024-11-18 PROCEDURE — 3078F DIAST BP <80 MM HG: CPT | Performed by: PHYSICIAN ASSISTANT

## 2024-11-18 PROCEDURE — 1159F MED LIST DOCD IN RCRD: CPT | Performed by: PHYSICIAN ASSISTANT

## 2024-11-18 NOTE — PATIENT INSTRUCTIONS
"What is ear wax impaction?   - Ear wax impaction is when ear wax builds up enough to cause symptoms. Normally, ear wax helps to protect the insides of the ears and prevents injury or infection. But having too much ear wax can cause symptoms such as pain and trouble hearing. The medical term for ear wax is \"cerumen.\"  Young children and older adults are more likely than others to have ear wax impaction.    What causes ear wax impaction? - Several different things can cause ear wax impaction:  -Diseases that affect the ear - Some health problems can affect the shape of the inside of the ear, and make it hard for wax to move out. For example, skin problems that cause skin cells to shed a lot can lead to wax build-up in the ears.  -A narrow ear canal - In some people, the ear canals are narrower than in others. These people might be more likely to have ear wax impaction. A person's ear canal can become narrower after an ear injury or after severe or multiple ear infections.  -Changes in ear wax and lining due to aging - As people get older, their ear wax gets harder and thicker. This makes it difficult for the wax to move out of the ear as it should.  -Bad ear-cleaning habits - Some people try to clean their ears using cotton swabs (Q-Tips) or other tools. This can actually push the wax deeper into the ear instead of getting it out. Over time, this can cause ear wax impaction.  -Making too much ear wax - Some people make more ear wax than others. This can happen when water gets trapped in the ear, or when the ear is injured. But some people have a lot of ear wax for no obvious reason.    What are the symptoms of ear wax impaction? - The symptoms include:  -Trouble hearing  -Pain in the ear  -Hearing a ringing noise in the ear  -Feeling like the ear is blocked or plugged    These symptoms can happen in one or both ears.  Should I see a doctor or nurse? - Yes. If you or your child has any of these symptoms, see a doctor " or nurse. He or she can check the insides of the ears to figure out if the symptoms are caused by ear wax impaction or another problem, such as an ear infection.  Should I clean my (or my child's) ears at home? - No. The insides of the ears do not usually need to be cleaned. Sticking things into the ears can push the wax in deeper and cause impaction.    How is ear wax impaction treated? - There are several treatments to remove impacted ear wax. Doctors and nurses offer these treatments only to people who have bothersome symptoms. They do not recommend treatments for removing ear wax in people who have no symptoms, even if their ears are impacted.  In some cases, doctors and nurses will remove ear wax in people whose ears are impacted and who aren't able to let others know if they have symptoms or not. This can include young children, and people who are confused or have trouble speaking, including some older adults.    There are several different ways to remove ear wax:  -Ear drops - Special ear drops can soften ear wax and help it to drain out. Ear drops are not usually safe for people with an ear infection or damage to the eardrum.  -Rinsing - In some cases, a doctor or nurse can remove impacted ear wax by squirting water (or a special liquid) into the ear to rinse it out.  Special tools - A doctor or nurse might use a special tool to remove ear wax. There are different types of tools that can do this safely. These include small sticks, hooks, and spoons. There are also tools that use suction to pull the wax out.

## 2024-11-18 NOTE — PROGRESS NOTES
Subjective   Patient ID: Rupali Bishop is a 69 y.o. female. They present today with a chief complaint of Cerumen Impaction (Right ear ).    History of Present Illness  HPI  Patient presents to the urgent care as a 69-year-old female with concerns for possible cerumen impaction of her right ear.  Patient states her primary care recommended her to use eardrops, but her symptoms did not improve patient states she got a bulb syringe and tried to flush her ear, but no cerumen came out.    Past Medical History  Allergies as of 11/18/2024 - Reviewed 11/18/2024   Allergen Reaction Noted    Penicillins Hives and Unknown 05/10/2006       (Not in a hospital admission)       Past Medical History:   Diagnosis Date    Cataract     Essential (primary) hypertension 11/16/2022    Hypertension    Glaucoma     Hyperlipidemia, unspecified 11/16/2022    Hyperlipidemia    Occlusion and stenosis of unspecified carotid artery 10/13/2015    Carotid stenosis    Other cerebral infarction due to occlusion or stenosis of small artery 04/22/2022    Lacunar infarction    Personal history of diseases of the blood and blood-forming organs and certain disorders involving the immune mechanism     History of bleeding disorder       Past Surgical History:   Procedure Laterality Date    CT ANGIO NECK  2/24/2022    CT NECK ANGIO W AND WO IV CONTRAST 2/24/2022 [facility]    MR HEAD ANGIO WO IV CONTRAST  2/26/2013    MR HEAD ANGIO WO IV CONTRAST 2/26/2013 [facility]INICAL [facility]GAC[facility]    MR HEAD ANGIO WO IV CONTRAST  2/23/2022    MR HEAD ANGIO WO IV CONTRAST 2/23/2022 [facility]    MR NECK ANGIO WO IV CONTRAST  2/26/2013    MR NECK ANGIO WO IV CONTRAST 2/26/2013 [facility]    MR NECK ANGIO WO IV CONTRAST  2/23/2022    MR NECK ANGIO WO IV CONTRAST 2/23/2022 [facility]        reports that she quit smoking about 21 months ago. Her smoking use included cigarettes. She has never used smokeless tobacco. She reports current alcohol  use. She reports that she does not use drugs.    Review of Systems  Review of Systems     Patient denies sore throat, fever, nausea, diarrhea, vomiting, rash, dizziness, shortness of breath, increased urinary frequency, chills, peripheral edema, abdominal pain, chest pain.                          Objective    Vitals:    11/18/24 1605 11/18/24 1606   BP: (!) 189/79 165/69   BP Location: Right arm Left arm   Pulse: 75    Temp: 36.5 °C (97.7 °F)    SpO2: 96%    Weight: 61.7 kg (136 lb)      No LMP recorded. Patient is postmenopausal.    Physical Exam  Appearance: Alert, oriented, cooperative, in no acute distress. Well nourished & well hydrated.    Skin: Intact, no lesions, rash, petechiae or purpura.     Eyes: Conjunctiva pink with no redness or exudates. Eyelids without lesions. No scleral icterus.     ENT: Hearing grossly intact. External inspection of ears without lesions or erythema. no nasal flaring. no tripoding, no drooling, no difficulty swallowing oral secretions. Rt TM obscured by cerumen, EAC nonerythematous, Lt TM good light reflex, non-bulging, EAC no erythema or edema.  Cerumen was removed with irrigation, Rt TM well visualized after, non-bulging, good light reflex    Pulmonary:  Good chest wall excursion. No accessory muscle use or stridor. No difficulty completing a full sentence without taking a breath, no labored breathing w ambulation     Cardiac:  No JVD.     Musculoskeletal: no deformity. No cyanosis, clubbing.     Neurological: no focal findings identified.    Psychiatric: Appropriate mood and affect.     Procedures    Point of Care Test & Imaging Results from this visit  No results found for this visit on 11/18/24.   No results found.    Diagnostic study results (if any) were reviewed by Carmen King PA-C.    Assessment/Plan   Allergies, medications, history, and pertinent labs/EKGs/Imaging reviewed by Carmen King PA-C.     Medical Decision Making  Considerations for patient's  symptoms include cerumen impaction, otitis externa, otitis media, middle ear effusion. No signs of systemic illness or osteomyelitis at this time, VSS, patient is afebrile, denies chills, fevers, nausea, and no signs of mastoiditis, no mastoid tenderness on exam. Physical exam: rt TM obscured by cerumen, Cerumen was removed with irrigation, rt TM well visualized after, non-bulging, good light reflex, pt reports symptom resolution. If symptoms are not improving or if they are worsening the patient will call their primary care physician and go to the ER. Patient verbalizes understanding and agrees with plan of care.  All questions were answered.  Dictation software was used in the creation of this note which does not evaluate or correct for typographical, spelling, syntax or grammatical errors.     Orders and Diagnoses  There are no diagnoses linked to this encounter.    Medical Admin Record      Patient disposition: Home    Electronically signed by Carmen King PA-C  4:15 PM

## 2025-01-07 ENCOUNTER — OFFICE VISIT (OUTPATIENT)
Dept: GASTROENTEROLOGY | Facility: HOSPITAL | Age: 70
End: 2025-01-07
Payer: COMMERCIAL

## 2025-01-07 VITALS — WEIGHT: 136 LBS | BODY MASS INDEX: 25.68 KG/M2 | HEIGHT: 61 IN

## 2025-01-07 DIAGNOSIS — D12.6 ADENOMATOUS POLYP OF COLON, UNSPECIFIED PART OF COLON: Primary | ICD-10-CM

## 2025-01-07 PROCEDURE — 1159F MED LIST DOCD IN RCRD: CPT

## 2025-01-07 PROCEDURE — 99204 OFFICE O/P NEW MOD 45 MIN: CPT

## 2025-01-07 PROCEDURE — 99214 OFFICE O/P EST MOD 30 MIN: CPT

## 2025-01-07 PROCEDURE — 3008F BODY MASS INDEX DOCD: CPT

## 2025-01-07 RX ORDER — POLYETHYLENE GLYCOL 3350, SODIUM SULFATE ANHYDROUS, SODIUM BICARBONATE, SODIUM CHLORIDE, POTASSIUM CHLORIDE 236; 22.74; 6.74; 5.86; 2.97 G/4L; G/4L; G/4L; G/4L; G/4L
4000 POWDER, FOR SOLUTION ORAL ONCE
Qty: 4000 ML | Refills: 0 | Status: SHIPPED | OUTPATIENT
Start: 2025-01-07 | End: 2025-01-07

## 2025-01-07 ASSESSMENT — ENCOUNTER SYMPTOMS
BLOOD IN STOOL: 0
COUGH: 0
CONSTIPATION: 0
FEVER: 0
ABDOMINAL PAIN: 0
CHILLS: 0
DIARRHEA: 0
APPETITE CHANGE: 0
SHORTNESS OF BREATH: 0
RECTAL PAIN: 0
FATIGUE: 0
ANAL BLEEDING: 0
VOMITING: 0
TROUBLE SWALLOWING: 0
ABDOMINAL DISTENTION: 0
NAUSEA: 0

## 2025-01-07 NOTE — PATIENT INSTRUCTIONS
Please schedule your colonoscopy. You will need a ride since this involves sedation.  I will send a bowel prep to your pharmacy.  Clear liquid diet the day before the procedure. Start the 1st part of the prep at 6 pm the night prior and the other half 5 hrs before the procedure.  Please hold Plavix 5 days prior to your procedure.  Confirm with prescribing physician.

## 2025-01-07 NOTE — PROGRESS NOTES
Subjective     History of Present Illness:   Rupali Bishop is a 69 y.o. female with PMHx of CVA on plavix, CAD, tobacco use disorder, HTN, HLD who presents to GI clinic for further evaluation of colonoscopy consultation    Today,  [place] states she moves her bowels daily to every other day with normal formed stools. States she does not notice any blood in her stools or any GI abnormalities.  Denies constipation, diarrhea, dyspepsia, melena, hematochezia, dysphagia, unintentional weight loss  10/2024 hemoglobin 9.3- normocytic anemia, 2024 hemoglobin 12.0    Rare ETOH, denies smoking or marijuana  Denies fxh GI cancer or IBD  Abdominal Surgeries: denies    Last colonoscopy 3/2021 Dr. White for screenin mm TA rectum, sigmoid diverticulosis, external hemorrhoids.  Repeat 5 years  Last EGD   2022 echo- normal EF      Past Medical History  As per HPI.     Social History  she  reports that she quit smoking about 2 years ago. Her smoking use included cigarettes. She has never used smokeless tobacco. She reports current alcohol use. She reports that she does not use drugs.     Family History  her family history includes Breast cancer in her sister; Glaucoma in her mother and sister.     Review of Systems  Review of Systems   Constitutional:  Negative for appetite change, chills, fatigue and fever.   HENT:  Negative for trouble swallowing.    Respiratory:  Negative for cough and shortness of breath.    Gastrointestinal:  Negative for abdominal distention, abdominal pain, anal bleeding, blood in stool, constipation, diarrhea, nausea, rectal pain and vomiting.       Allergies  Allergies   Allergen Reactions    Penicillins Hives and Unknown       Medications  Current Outpatient Medications   Medication Instructions    albuterol 90 mcg/actuation inhaler 2 PUFFS INHALED ORALLY EVERY 6 HOURS AS NEEDED    aspirin 81 mg, oral, Daily RT    atorvastatin (LIPITOR) 80 mg, oral, Daily    brimonidine-timoloL (Combigan) 0.2-0.5  % ophthalmic solution INSTILL 1 DROP TWICE A DAY INTO LEFT EYE    clopidogrel (Plavix) 75 mg tablet TAKE 1 TABLET (75 MG) BY MOUTH DAILY    dorzolamide-timoloL (Cosopt) 22.3-6.8 mg/mL ophthalmic solution 1 drop, Left Eye, 2 times daily    latanoprost (Xalatan) 0.005 % ophthalmic solution 1 DROP EACH EYE ONCE A DAY    lisinopriL-hydrochlorothiazide 20-12.5 mg tablet 1 TABLET ORALLY DAILY    metoprolol tartrate (LOPRESSOR) 25 mg, oral, 2 times daily    pantoprazole (PROTONIX) 20 mg, oral, Daily    prednisoLONE acetate (Pred-Forte) 1 % ophthalmic suspension 1 DROP INTO LEFT EYE EVERY DAY        Objective   There were no vitals taken for this visit.   Physical Exam  Constitutional:       Appearance: Normal appearance. She is normal weight.   HENT:      Mouth/Throat:      Mouth: Mucous membranes are dry.      Pharynx: Oropharynx is clear.   Cardiovascular:      Rate and Rhythm: Normal rate and regular rhythm.   Pulmonary:      Effort: Pulmonary effort is normal.      Breath sounds: Normal breath sounds. No wheezing or rhonchi.   Abdominal:      General: Abdomen is flat. Bowel sounds are normal. There is no distension.      Palpations: Abdomen is soft. There is no hepatomegaly.      Tenderness: There is no abdominal tenderness. There is no guarding or rebound. Negative signs include Conde's sign.      Hernia: No hernia is present.   Musculoskeletal:         General: Normal range of motion.   Skin:     General: Skin is warm and dry.   Neurological:      General: No focal deficit present.      Mental Status: She is alert and oriented to person, place, and time. Mental status is at baseline.   Psychiatric:         Mood and Affect: Mood normal.         Behavior: Behavior normal.           Lab Results   Component Value Date    WBC 7.2 10/02/2024    WBC 6.1 02/08/2024    WBC 6.3 02/13/2023    HGB 9.3 (L) 10/02/2024    HGB 12.0 02/08/2024    HGB 11.4 (L) 02/13/2023    HCT 29.1 (L) 10/02/2024    HCT 37.3 02/08/2024    HCT 35.3  (L) 02/13/2023     10/02/2024     02/08/2024     02/13/2023     Lab Results   Component Value Date     10/02/2024     02/08/2024     02/13/2023    K 3.9 10/02/2024    K 4.2 02/08/2024    K 4.1 02/13/2023     10/02/2024     02/08/2024     02/13/2023    CO2 30 10/02/2024    CO2 30 02/08/2024    CO2 28 02/13/2023    BUN 11 10/02/2024    BUN 13 02/08/2024    BUN 10 02/13/2023    CREATININE 0.80 10/02/2024    CREATININE 0.81 02/08/2024    CREATININE 0.73 02/13/2023    CALCIUM 9.7 10/02/2024    CALCIUM 9.5 02/08/2024    CALCIUM 8.8 02/13/2023    PROT 6.7 10/02/2024    PROT 6.5 02/08/2024    PROT 6.4 02/13/2023    BILITOT 0.3 10/02/2024    BILITOT 0.5 02/08/2024    BILITOT 0.4 02/13/2023    ALKPHOS 57 10/02/2024    ALKPHOS 56 02/08/2024    ALKPHOS 51 02/13/2023    ALT 15 10/02/2024    ALT 11 02/08/2024    ALT 13 02/13/2023    AST 35 10/02/2024    AST 36 02/08/2024    AST 34 02/13/2023    GLUCOSE 102 (H) 10/02/2024    GLUCOSE 98 02/08/2024    GLUCOSE 81 02/13/2023           Rupali Bishop is a 69 y.o. female who presents to GI clinic for colonoscopy.    Adenomatous polyp of colon  - schedule colonoscopy   - confirmed with cardiology to hold plavix 5 days prior to procedure        Anemia  Rule out GI malignancy.  Less likely due to GI blood loss  - colonoscopy ordered         Andra Woods, APRN-CNP

## 2025-01-30 ENCOUNTER — APPOINTMENT (OUTPATIENT)
Dept: OPHTHALMOLOGY | Facility: CLINIC | Age: 70
End: 2025-01-30
Payer: COMMERCIAL

## 2025-01-30 DIAGNOSIS — H40.10X3 OPEN-ANGLE GLAUCOMA OF BOTH EYES, SEVERE STAGE, UNSPECIFIED OPEN-ANGLE GLAUCOMA TYPE: Primary | ICD-10-CM

## 2025-01-30 PROCEDURE — 99214 OFFICE O/P EST MOD 30 MIN: CPT | Performed by: OPHTHALMOLOGY

## 2025-01-30 ASSESSMENT — VISUAL ACUITY
OD_CC+: +1
METHOD: SNELLEN - LINEAR
CORRECTION_TYPE: GLASSES
OS_CC: NLP
OD_CC: 20/30
OD_PH_CC: 20/25

## 2025-01-30 ASSESSMENT — EXTERNAL EXAM - LEFT EYE: OS_EXAM: NORMAL

## 2025-01-30 ASSESSMENT — TONOMETRY
OS_IOP_MMHG: 22
OD_IOP_MMHG: 13
IOP_METHOD: GOLDMANN APPLANATION

## 2025-01-30 ASSESSMENT — CUP TO DISC RATIO: OD_RATIO: 0.75

## 2025-01-30 ASSESSMENT — EXTERNAL EXAM - RIGHT EYE: OD_EXAM: NORMAL

## 2025-01-30 ASSESSMENT — PACHYMETRY
OD_CT(UM): 570
OS_CT(UM): 597

## 2025-01-30 NOTE — PROGRESS NOTES
1-oag stable same meds  2-ns ou os>>> od  3-sanchez tears ou  4-blind os    Rto 4 mos b scan ultrasound os

## 2025-02-17 PROBLEM — D64.9 ANEMIA: Status: ACTIVE | Noted: 2025-02-17

## 2025-02-18 ENCOUNTER — APPOINTMENT (OUTPATIENT)
Dept: GASTROENTEROLOGY | Facility: EXTERNAL LOCATION | Age: 70
End: 2025-02-18
Payer: COMMERCIAL

## 2025-05-09 DIAGNOSIS — Z12.11 SCREENING FOR COLON CANCER: Primary | ICD-10-CM

## 2025-05-09 RX ORDER — SODIUM, POTASSIUM,MAG SULFATES 17.5-3.13G
SOLUTION, RECONSTITUTED, ORAL ORAL
Qty: 354 ML | Refills: 0 | Status: SHIPPED | OUTPATIENT
Start: 2025-05-09

## 2025-05-16 DIAGNOSIS — H40.1130 PRIMARY OPEN ANGLE GLAUCOMA OF BOTH EYES, UNSPECIFIED GLAUCOMA STAGE: ICD-10-CM

## 2025-05-17 RX ORDER — LATANOPROST 50 UG/ML
SOLUTION/ DROPS OPHTHALMIC
Qty: 2.5 ML | Refills: 12 | Status: SHIPPED | OUTPATIENT
Start: 2025-05-17

## 2025-06-04 ENCOUNTER — APPOINTMENT (OUTPATIENT)
Dept: GASTROENTEROLOGY | Facility: HOSPITAL | Age: 70
End: 2025-06-04
Payer: COMMERCIAL

## 2025-06-10 DIAGNOSIS — H40.1130 PRIMARY OPEN ANGLE GLAUCOMA OF BOTH EYES, UNSPECIFIED GLAUCOMA STAGE: ICD-10-CM

## 2025-06-10 RX ORDER — BRIMONIDINE TARTRATE AND TIMOLOL MALEATE 2; 5 MG/ML; MG/ML
SOLUTION OPHTHALMIC
Qty: 10 ML | Refills: 11 | Status: SHIPPED | OUTPATIENT
Start: 2025-06-10 | End: 2025-06-12

## 2025-06-12 ENCOUNTER — APPOINTMENT (OUTPATIENT)
Dept: OPHTHALMOLOGY | Facility: CLINIC | Age: 70
End: 2025-06-12
Payer: COMMERCIAL

## 2025-06-12 DIAGNOSIS — H04.129 DRY EYE: ICD-10-CM

## 2025-06-12 PROCEDURE — 99214 OFFICE O/P EST MOD 30 MIN: CPT | Performed by: OPHTHALMOLOGY

## 2025-06-12 RX ORDER — PREDNISOLONE ACETATE 10 MG/ML
SUSPENSION/ DROPS OPHTHALMIC
Qty: 5 ML | Refills: 11 | Status: SHIPPED | OUTPATIENT
Start: 2025-06-12

## 2025-06-12 ASSESSMENT — PACHYMETRY
OS_CT(UM): 597
OD_CT(UM): 570

## 2025-06-12 ASSESSMENT — ENCOUNTER SYMPTOMS: EYES NEGATIVE: 1

## 2025-06-12 ASSESSMENT — TONOMETRY
OD_IOP_MMHG: 13
OS_IOP_MMHG: 39
IOP_METHOD: GOLDMANN APPLANATION

## 2025-06-12 ASSESSMENT — VISUAL ACUITY
OD_CC: 20/60+2
OD_PH_CC: 20/30+2
METHOD: SNELLEN - LINEAR
OS_CC: NLP

## 2025-06-12 ASSESSMENT — CUP TO DISC RATIO: OD_RATIO: 0.75

## 2025-06-12 ASSESSMENT — EXTERNAL EXAM - RIGHT EYE: OD_EXAM: NORMAL

## 2025-06-12 ASSESSMENT — EXTERNAL EXAM - LEFT EYE: OS_EXAM: NORMAL

## 2025-06-12 NOTE — PROGRESS NOTES
1-open angle glaucoma (OAG)ROCKLATAN left eye HS AND BROMONIDINE BID BOTH every day every day left eye    2-CRISTINA TEARS both eyes PRN BOTH   Intraocular pressure (IOP) CHEC 6 WEEK    3-OLD retinal detachment (RD) LEFT EYE optic nerve (ON) B SCAN   I have personally evaluated and examined the patient. The Attending was available to me as a supervising provider if needed.

## 2025-06-13 ENCOUNTER — APPOINTMENT (OUTPATIENT)
Dept: GASTROENTEROLOGY | Facility: EXTERNAL LOCATION | Age: 70
End: 2025-06-13
Payer: COMMERCIAL

## 2025-06-13 DIAGNOSIS — Z13.9 SCREENING DUE: ICD-10-CM

## 2025-06-13 DIAGNOSIS — Z12.11 COLON CANCER SCREENING: Primary | ICD-10-CM

## 2025-06-13 DIAGNOSIS — Z86.0101 HISTORY OF ADENOMATOUS POLYP OF COLON: ICD-10-CM

## 2025-06-13 PROCEDURE — G0105 COLORECTAL SCRN; HI RISK IND: HCPCS | Performed by: INTERNAL MEDICINE

## 2025-06-13 NOTE — PROGRESS NOTES
Colonoscopy performed today 6/13/2025 at the [facility] ([facility]).  Of note, patient drank coffee with cream this morning, so the procedure had to be delayed by 6 hours.  See procedure report(s) under Media tab.

## 2025-06-17 ENCOUNTER — HOSPITAL ENCOUNTER (OUTPATIENT)
Dept: RADIOLOGY | Facility: CLINIC | Age: 70
Discharge: HOME | End: 2025-06-17
Payer: COMMERCIAL

## 2025-06-17 VITALS — WEIGHT: 136.02 LBS | BODY MASS INDEX: 25.68 KG/M2 | HEIGHT: 61 IN

## 2025-06-17 DIAGNOSIS — Z12.31 ENCOUNTER FOR SCREENING MAMMOGRAM FOR MALIGNANT NEOPLASM OF BREAST: ICD-10-CM

## 2025-06-17 PROCEDURE — 77067 SCR MAMMO BI INCL CAD: CPT

## 2025-06-17 PROCEDURE — 77063 BREAST TOMOSYNTHESIS BI: CPT | Performed by: STUDENT IN AN ORGANIZED HEALTH CARE EDUCATION/TRAINING PROGRAM

## 2025-06-17 PROCEDURE — 77067 SCR MAMMO BI INCL CAD: CPT | Performed by: STUDENT IN AN ORGANIZED HEALTH CARE EDUCATION/TRAINING PROGRAM

## 2025-06-23 DIAGNOSIS — H40.10X1 OPEN-ANGLE GLAUCOMA OF BOTH EYES, MILD STAGE, UNSPECIFIED OPEN-ANGLE GLAUCOMA TYPE: Primary | ICD-10-CM

## 2025-06-23 DIAGNOSIS — H40.1130 PRIMARY OPEN ANGLE GLAUCOMA OF BOTH EYES, UNSPECIFIED GLAUCOMA STAGE: ICD-10-CM

## 2025-06-23 DIAGNOSIS — H40.10X3 OPEN-ANGLE GLAUCOMA OF BOTH EYES, SEVERE STAGE, UNSPECIFIED OPEN-ANGLE GLAUCOMA TYPE: Primary | ICD-10-CM

## 2025-06-23 RX ORDER — BRIMONIDINE TARTRATE 2 MG/ML
1 SOLUTION/ DROPS OPHTHALMIC 2 TIMES DAILY
Qty: 15 ML | Refills: 3 | Status: SHIPPED | OUTPATIENT
Start: 2025-06-23

## 2025-06-23 RX ORDER — BRIMONIDINE TARTRATE 2 MG/ML
1 SOLUTION/ DROPS OPHTHALMIC 2 TIMES DAILY
COMMUNITY
End: 2025-06-23 | Stop reason: SDUPTHER

## 2025-06-24 RX ORDER — LATANOPROST 50 UG/ML
SOLUTION/ DROPS OPHTHALMIC
Qty: 2.5 ML | Refills: 12 | Status: SHIPPED | OUTPATIENT
Start: 2025-06-24

## 2025-06-24 RX ORDER — BRIMONIDINE TARTRATE 2 MG/ML
SOLUTION/ DROPS OPHTHALMIC
Qty: 10 ML | Refills: 11 | Status: SHIPPED | OUTPATIENT
Start: 2025-06-24

## 2025-07-17 ENCOUNTER — APPOINTMENT (OUTPATIENT)
Dept: OPHTHALMOLOGY | Facility: CLINIC | Age: 70
End: 2025-07-17
Payer: COMMERCIAL

## 2025-07-17 DIAGNOSIS — H40.10X3 OPEN-ANGLE GLAUCOMA OF BOTH EYES, SEVERE STAGE, UNSPECIFIED OPEN-ANGLE GLAUCOMA TYPE: Primary | ICD-10-CM

## 2025-07-17 PROCEDURE — 99214 OFFICE O/P EST MOD 30 MIN: CPT | Performed by: OPHTHALMOLOGY

## 2025-07-17 ASSESSMENT — PACHYMETRY
OS_CT(UM): 597
OD_CT(UM): 570

## 2025-07-17 ASSESSMENT — CUP TO DISC RATIO: OD_RATIO: 0.75

## 2025-07-17 ASSESSMENT — VISUAL ACUITY
OD_CC: 20/30-2
OD_PH_CC: 20/25+2
OS_CC: NLP
METHOD: SNELLEN - LINEAR

## 2025-07-17 ASSESSMENT — EXTERNAL EXAM - LEFT EYE: OS_EXAM: NORMAL

## 2025-07-17 ASSESSMENT — EXTERNAL EXAM - RIGHT EYE: OD_EXAM: NORMAL

## 2025-07-17 ASSESSMENT — TONOMETRY
IOP_METHOD: GOLDMANN APPLANATION
OS_IOP_MMHG: 24
OD_IOP_MMHG: 12

## 2025-07-29 ENCOUNTER — HOSPITAL ENCOUNTER (OUTPATIENT)
Dept: RADIOLOGY | Facility: CLINIC | Age: 70
Discharge: HOME | End: 2025-07-29
Payer: COMMERCIAL

## 2025-07-29 DIAGNOSIS — R92.8 ABNORMAL MAMMOGRAM: ICD-10-CM

## 2025-07-29 DIAGNOSIS — R92.8 OTHER ABNORMAL AND INCONCLUSIVE FINDINGS ON DIAGNOSTIC IMAGING OF BREAST: ICD-10-CM

## 2025-07-29 DIAGNOSIS — Z12.31 ENCOUNTER FOR SCREENING MAMMOGRAM FOR MALIGNANT NEOPLASM OF BREAST: ICD-10-CM

## 2025-07-29 PROCEDURE — 76982 USE 1ST TARGET LESION: CPT

## 2025-07-29 PROCEDURE — 76642 ULTRASOUND BREAST LIMITED: CPT | Mod: RT

## 2025-07-29 PROCEDURE — 77065 DX MAMMO INCL CAD UNI: CPT | Mod: RT

## 2025-07-29 PROCEDURE — 77061 BREAST TOMOSYNTHESIS UNI: CPT | Mod: RIGHT SIDE | Performed by: RADIOLOGY

## 2025-07-29 PROCEDURE — 76642 ULTRASOUND BREAST LIMITED: CPT | Mod: RIGHT SIDE | Performed by: RADIOLOGY

## 2025-07-29 PROCEDURE — 77065 DX MAMMO INCL CAD UNI: CPT | Mod: RIGHT SIDE | Performed by: RADIOLOGY

## 2025-08-05 ENCOUNTER — TELEPHONE (OUTPATIENT)
Dept: OPHTHALMOLOGY | Facility: CLINIC | Age: 70
End: 2025-08-05
Payer: COMMERCIAL

## 2025-08-05 NOTE — TELEPHONE ENCOUNTER
Returned patients call to 's office,no answer left a voicemail to return my call with my call back number.    8/5 at 1:34pm

## 2025-10-30 ENCOUNTER — APPOINTMENT (OUTPATIENT)
Dept: OPHTHALMOLOGY | Facility: CLINIC | Age: 70
End: 2025-10-30
Payer: COMMERCIAL